# Patient Record
Sex: FEMALE | Race: WHITE
[De-identification: names, ages, dates, MRNs, and addresses within clinical notes are randomized per-mention and may not be internally consistent; named-entity substitution may affect disease eponyms.]

---

## 2018-06-03 ENCOUNTER — HOSPITAL ENCOUNTER (INPATIENT)
Dept: HOSPITAL 26 - MED | Age: 43
LOS: 5 days | Discharge: HOME | DRG: 263 | End: 2018-06-08
Attending: GENERAL PRACTICE | Admitting: GENERAL PRACTICE
Payer: MEDICAID

## 2018-06-03 VITALS — DIASTOLIC BLOOD PRESSURE: 47 MMHG | SYSTOLIC BLOOD PRESSURE: 143 MMHG

## 2018-06-03 VITALS — HEIGHT: 61 IN | WEIGHT: 180 LBS | BODY MASS INDEX: 33.99 KG/M2

## 2018-06-03 VITALS — SYSTOLIC BLOOD PRESSURE: 115 MMHG | DIASTOLIC BLOOD PRESSURE: 50 MMHG

## 2018-06-03 VITALS — DIASTOLIC BLOOD PRESSURE: 84 MMHG | SYSTOLIC BLOOD PRESSURE: 144 MMHG

## 2018-06-03 DIAGNOSIS — K59.03: ICD-10-CM

## 2018-06-03 DIAGNOSIS — E78.5: ICD-10-CM
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
## 2018-06-03 DIAGNOSIS — K80.12: Primary | ICD-10-CM

## 2018-06-03 DIAGNOSIS — R31.9: ICD-10-CM

## 2018-06-03 DIAGNOSIS — E87.6: ICD-10-CM

## 2018-06-03 DIAGNOSIS — T50.995A: ICD-10-CM

## 2018-06-03 LAB
ALBUMIN FLD-MCNC: 3.6 G/DL (ref 3.4–5)
AMYLASE SERPL-CCNC: 74 U/L (ref 25–115)
AMYLASE SERPL-CCNC: 82 U/L (ref 25–115)
ANION GAP SERPL CALCULATED.3IONS-SCNC: 13.3 MMOL/L (ref 8–16)
APPEARANCE UR: (no result)
AST SERPL-CCNC: 26 U/L (ref 15–37)
BARBITURATES UR QL SCN: (no result) NG/ML
BASOPHILS # BLD AUTO: 0 K/UL (ref 0–0.22)
BASOPHILS NFR BLD AUTO: 0.5 % (ref 0–2)
BENZODIAZ UR QL SCN: (no result) NG/ML
BILIRUB SERPL-MCNC: 0.3 MG/DL (ref 0–1)
BILIRUB UR QL STRIP: NEGATIVE
BUN SERPL-MCNC: 15 MG/DL (ref 7–18)
BZE UR QL SCN: (no result) NG/ML
CANNABINOIDS UR QL SCN: (no result) NG/ML
CHLORIDE SERPL-SCNC: 102 MMOL/L (ref 98–107)
CHOLEST/HDLC SERPL: 7.3 {RATIO} (ref 1–4.5)
CO2 SERPL-SCNC: 25.1 MMOL/L (ref 21–32)
COLOR UR: YELLOW
CREAT SERPL-MCNC: 0.9 MG/DL (ref 0.6–1.3)
EOSINOPHIL # BLD AUTO: 0.1 K/UL (ref 0–0.4)
EOSINOPHIL NFR BLD AUTO: 1 % (ref 0–4)
ERYTHROCYTE [DISTWIDTH] IN BLOOD BY AUTOMATED COUNT: 13.3 % (ref 11.6–13.7)
GFR SERPL CREATININE-BSD FRML MDRD: 89 ML/MIN (ref 90–?)
GLUCOSE SERPL-MCNC: 110 MG/DL (ref 74–106)
GLUCOSE UR STRIP-MCNC: NEGATIVE MG/DL
HCT VFR BLD AUTO: 43 % (ref 36–48)
HDLC SERPL-MCNC: 34 MG/DL (ref 40–60)
HGB BLD-MCNC: 14.3 G/DL (ref 12–16)
HGB UR QL STRIP: (no result)
LDLC SERPL CALC-MCNC: 189 MG/DL (ref 60–100)
LEUKOCYTE ESTERASE UR QL STRIP: NEGATIVE
LIPASE SERPL-CCNC: 126 U/L (ref 73–393)
LIPASE SERPL-CCNC: 177 U/L (ref 73–393)
LYMPHOCYTES # BLD AUTO: 2.5 K/UL (ref 2.5–16.5)
LYMPHOCYTES NFR BLD AUTO: 27.9 % (ref 20.5–51.1)
MAGNESIUM SERPL-MCNC: 2 MG/DL (ref 1.8–2.4)
MCH RBC QN AUTO: 29 PG (ref 27–31)
MCHC RBC AUTO-ENTMCNC: 33 G/DL (ref 33–37)
MCV RBC AUTO: 87.2 FL (ref 80–94)
MONOCYTES # BLD AUTO: 0.5 K/UL (ref 0.8–1)
MONOCYTES NFR BLD AUTO: 6.1 % (ref 1.7–9.3)
NEUTROPHILS # BLD AUTO: 5.7 K/UL (ref 1.8–7.7)
NEUTROPHILS NFR BLD AUTO: 64.5 % (ref 42.2–75.2)
NITRITE UR QL STRIP: NEGATIVE
OPIATES UR QL SCN: (no result) NG/ML
PCP UR QL SCN: (no result) NG/ML
PH UR STRIP: 5.5 [PH] (ref 5–9)
PHOSPHATE SERPL-MCNC: 2.8 MG/DL (ref 2.5–4.9)
PLATELET # BLD AUTO: 320 K/UL (ref 140–450)
POTASSIUM SERPL-SCNC: 3.4 MMOL/L (ref 3.5–5.1)
PROTHROMBIN TIME: 10.6 SECS (ref 10.8–13.4)
RBC # BLD AUTO: 4.92 MIL/UL (ref 4.2–5.4)
RBC #/AREA URNS HPF: (no result) /HPF (ref 0–5)
SODIUM SERPL-SCNC: 137 MMOL/L (ref 136–145)
T4 FREE SERPL-MCNC: 1.14 NG/DL (ref 0.76–1.46)
TRIGL SERPL-MCNC: 126 MG/DL (ref 30–150)
TSH SERPL DL<=0.05 MIU/L-ACNC: 2.43 UIU/ML (ref 0.34–3.74)
WBC # BLD AUTO: 8.9 K/UL (ref 4.8–10.8)
WBC,URINE: (no result) /HPF (ref 0–5)

## 2018-06-03 PROCEDURE — C9113 INJ PANTOPRAZOLE SODIUM, VIA: HCPCS

## 2018-06-03 PROCEDURE — A9503 TC99M MEDRONATE: HCPCS

## 2018-06-03 PROCEDURE — C1887 CATHETER, GUIDING: HCPCS

## 2018-06-03 PROCEDURE — A9510 TC99M DISOFENIN: HCPCS

## 2018-06-03 RX ADMIN — HYDROCODONE BITARTRATE AND ACETAMINOPHEN PRN TAB: 7.5; 325 TABLET ORAL at 22:18

## 2018-06-03 RX ADMIN — DOCUSATE SODIUM SCH MG: 100 CAPSULE, LIQUID FILLED ORAL at 20:48

## 2018-06-03 NOTE — NUR
PT BROUGHT IN FROM ER IN [facility], AMBULATES FROM HALLWAY TO 104B WITH STEADY GAIT, PT PLACED 
ON CARDIAC MONITOR, REPORT RECIVED FROM ER RN, PT AWAKE ALERT, RESP EVEN UNLABORED, SKIN 
WARM DRY COLOR WNL, PT DENIES PAIN OR DISCOMFORT AT THIS TIME, PT APPEARS COMFORTABLE, ABD 
SOFT, PT ORIENTED TO ROOM AND FLOOR, CALL SOARES WITHIN REACH SIDE RAILS UP, BED LOCKED IN LOW 
POSITION, WILL CONTINUE TO MONITOR AND REPORT TO ONCOMING SHIFT.

## 2018-06-03 NOTE — NUR
PT TRANSFERRED TO TELE ROOM 104B AT 1840 ON STABLE CONDITION SAFELY. VS WNL. 
REPORT GIVEN TO TELE NURSE FOR CONTINUITY OF CARE. HANDED ALL THE DOCUMENTS TO 
NURSE.

## 2018-06-03 NOTE — NUR
RECEIVED REPORT AT PT BEDSIDE FROM DAY SHIFT RN, FOR CONTINUITY OF CARE. PATIENT IS A/OX4 ON 
ROOM AIR, [place] SPEAKING ONLY. ABLE TO MAKE NEEDS KNOWN, ABLE TO FOLLOW COMMANDS.  PT SKIN 
IS INTACT. PT IS NPO. PATIENT HAS 20G IV TO RIGHT AC, ASYMPTOMATIC, INTACT, PATENT. 
RESPIRATIONS EVEN AND UNLABORED. UPDATED BOARD. VITAL SIGNS WITHIN NORMAL LIMITS. PT STABLE, 
NO SIGNS OF DISTRESS NOTED AT THIS TIME. BED IN LOWEST POSITION, BED ALARM ON. CALL LIGHT 
WITHIN REACH, WILL CONTINUE TO MONITOR.

## 2018-06-03 NOTE — NUR
PT APPEARS TO BE RESTING COMFORTABLY IN BED AT THIS TIME. NO ACUTE DISTRESS 
NOTED. NO CHANGE IN LOC. DENIES PAIN.  AT BEDSIDE.

## 2018-06-03 NOTE — NUR
PATIENT PRESENTS TO ED WITH THE CHIEF C/O ABDOMINAL PAIN . PT STATES PAIN 
STARTED SINCE YESTERDAY BECAME WORSE . PT HAS N/V. DENIES DIARRHEA. SKIN IS 
PINK/WARM/DRY; AAOX4 WITH EVEN AND STEADY GAIT. HR EVEN AND REGULAR; PT DENIES 
ANY FEVER, CP, SOB, OR COUGH AT THIS TIME. PATIENT STATES ABD PAIN OF 10/10 AT 
THIS TIME. ADMINISTERED MEDICATION AS ORDERED. VSS WNL. PATIENT POSITIONED FOR 
COMFORT; HOB ELEVATED; BEDRAILS UP X2; BED DOWN. ER MD MADE AWARE OF PT STATUS.

## 2018-06-03 NOTE — NUR
ADMINISTERED PAIN MEDICATION, PT TOLERATED WELL. PT STABLE, NO SIGNS OF DISTRESS NOTED AT 
THIS TIME. BED IN LOWEST POSITION, BED ALARM ON. CALL LIGHT WITHIN REACH, WILL CONTINUE TO 
MONITOR.

## 2018-06-03 NOTE — NUR
ADMINISTERED SCHEDULED MEDICATION, PT TOLERATED WELL. NO PROBLEMS SWALLOWING NOTED. NO SIGNS 
OF DISTRESS NOTED AT THIS TIME. BED IN LOWEST POSITION, BED ALARM ON. CALL LIGHT WITHIN 
REACH, WILL CONTINUE TO MONITOR.

## 2018-06-03 NOTE — NUR
SPOKE TO RADIOLOGY ABOUT HIDA SCAN BECAUSE PT IS IN PAIN AND WANTS PAIN MEDICATION. THEY 
WILL NOT BE ABLE TO DO HIDA SCAN TONIGHT SO IT'S OK TO GIVE NARCOTICS FOR NOW. WILL BE 
UPDATED IN MORNING OF TIME THEY WILL DO HIDA SCAN.

## 2018-06-04 VITALS — SYSTOLIC BLOOD PRESSURE: 113 MMHG | DIASTOLIC BLOOD PRESSURE: 52 MMHG

## 2018-06-04 VITALS — DIASTOLIC BLOOD PRESSURE: 63 MMHG | SYSTOLIC BLOOD PRESSURE: 114 MMHG

## 2018-06-04 VITALS — DIASTOLIC BLOOD PRESSURE: 79 MMHG | SYSTOLIC BLOOD PRESSURE: 138 MMHG

## 2018-06-04 VITALS — SYSTOLIC BLOOD PRESSURE: 126 MMHG | DIASTOLIC BLOOD PRESSURE: 68 MMHG

## 2018-06-04 VITALS — DIASTOLIC BLOOD PRESSURE: 56 MMHG | SYSTOLIC BLOOD PRESSURE: 107 MMHG

## 2018-06-04 VITALS — DIASTOLIC BLOOD PRESSURE: 67 MMHG | SYSTOLIC BLOOD PRESSURE: 113 MMHG

## 2018-06-04 LAB
ANION GAP SERPL CALCULATED.3IONS-SCNC: 18.2 MMOL/L (ref 8–16)
BASOPHILS # BLD AUTO: 0 K/UL (ref 0–0.22)
BASOPHILS NFR BLD AUTO: 0.4 % (ref 0–2)
BUN SERPL-MCNC: 11 MG/DL (ref 7–18)
CHLORIDE SERPL-SCNC: 102 MMOL/L (ref 98–107)
CO2 SERPL-SCNC: 25.7 MMOL/L (ref 21–32)
CREAT SERPL-MCNC: 0.7 MG/DL (ref 0.6–1.3)
EOSINOPHIL # BLD AUTO: 0.1 K/UL (ref 0–0.4)
EOSINOPHIL NFR BLD AUTO: 0.8 % (ref 0–4)
ERYTHROCYTE [DISTWIDTH] IN BLOOD BY AUTOMATED COUNT: 13.3 % (ref 11.6–13.7)
GFR SERPL CREATININE-BSD FRML MDRD: 119 ML/MIN (ref 90–?)
GLUCOSE SERPL-MCNC: 94 MG/DL (ref 74–106)
HCT VFR BLD AUTO: 39.2 % (ref 36–48)
HGB BLD-MCNC: 12.9 G/DL (ref 12–16)
LYMPHOCYTES # BLD AUTO: 1.8 K/UL (ref 2.5–16.5)
LYMPHOCYTES NFR BLD AUTO: 22.6 % (ref 20.5–51.1)
MAGNESIUM SERPL-MCNC: 2.2 MG/DL (ref 1.8–2.4)
MCH RBC QN AUTO: 29 PG (ref 27–31)
MCHC RBC AUTO-ENTMCNC: 33 G/DL (ref 33–37)
MCV RBC AUTO: 88 FL (ref 80–94)
MONOCYTES # BLD AUTO: 0.4 K/UL (ref 0.8–1)
MONOCYTES NFR BLD AUTO: 5.6 % (ref 1.7–9.3)
NEUTROPHILS # BLD AUTO: 5.6 K/UL (ref 1.8–7.7)
NEUTROPHILS NFR BLD AUTO: 70.6 % (ref 42.2–75.2)
PHOSPHATE SERPL-MCNC: 2.8 MG/DL (ref 2.5–4.9)
PLATELET # BLD AUTO: 294 K/UL (ref 140–450)
POTASSIUM SERPL-SCNC: 3.9 MMOL/L (ref 3.5–5.1)
RBC # BLD AUTO: 4.46 MIL/UL (ref 4.2–5.4)
SODIUM SERPL-SCNC: 142 MMOL/L (ref 136–145)
WBC # BLD AUTO: 7.9 K/UL (ref 4.8–10.8)

## 2018-06-04 RX ADMIN — DOCUSATE SODIUM SCH MG: 100 CAPSULE, LIQUID FILLED ORAL at 21:01

## 2018-06-04 RX ADMIN — PANTOPRAZOLE SODIUM SCH MG: 40 INJECTION, POWDER, FOR SOLUTION INTRAVENOUS at 10:53

## 2018-06-04 RX ADMIN — DEXTROSE AND SODIUM CHLORIDE SCH MLS/HR: 5; .9 INJECTION, SOLUTION INTRAVENOUS at 18:22

## 2018-06-04 RX ADMIN — DEXTROSE AND SODIUM CHLORIDE SCH MLS/HR: 5; .9 INJECTION, SOLUTION INTRAVENOUS at 10:30

## 2018-06-04 RX ADMIN — DOCUSATE SODIUM SCH MG: 100 CAPSULE, LIQUID FILLED ORAL at 10:53

## 2018-06-04 NOTE — NUR
RECEIVED PT FROM NIGHT SHIFT NURSE, AWAKE WITH  ON THE BEDSIDE, WITH AN IV LINE AT RT 
AC G.20, NS RUNNING AT 50ML/HR. PLAN OF CARE DISCUSSED. PT IS ALERT, ORIENTEDX4, NO PAIN 
THIS TIME.CALL LIGHT WITHIN REACH AND SIDE RAILS ARE UP. WILL CONTINUE TO MONITOR.

## 2018-06-04 NOTE — NUR
PT WENT FOR A HIDRA SCAN AND CT OF THE CHEST WITH IV CONTRAST. NO SIGN OF DISTRESS NOTED. PT 
IS STABLE.

## 2018-06-04 NOTE — NUR
PT DENIES PAIN. VITAL SIGNS WITHIN NORMAL LIMITS. PT STABLE, NO SIGNS OF DISTRESS NOTED AT 
THIS TIME. BED IN LOWEST POSITION, BED ALARM ON. CALL LIGHT WITHIN REACH, WILL CONTINUE TO 
MONITOR.

## 2018-06-04 NOTE — NUR
REPORT RECEIVED FROM AM NURSE. PT IN STABLE CONDITION. INTRODUCED MYSELF TO PT. AAOX4. EYES 
PERRL 3MM. LUNGS CLEAR BILATERALLY. HEART SOUNDS S1 S2 NORMAL. BOWEL SOUNDS ACTIVE X4 
QUADRANTS. CAP REFILL < 3S. SKIN INTACT, DRY, AND WARM. IV LEAKING. WILL PLACE NEW IV. VS 
STABLE. PT NOT IN ACUTE DISTRESS. BED LOCKED IN LOW POSITION. CALL BELL WITHIN REACH. WILL 
CONTINUE TO MONITOR.

## 2018-06-04 NOTE — NUR
ASSISTED PT TO THE BATHROOM AND BACK TO BED, IV WAS CHECKED. NO SIGN OF DISTRESS NOTED. CALL 
LIGHT WITHIN REACH AND WILL CONTINUE TO MONITOR.

## 2018-06-04 NOTE — NUR
CALLED DR LAWSON WITH RESULTS OF NM HIDA SCAN. RESULTS ARE "THE COMMON BILE DUCT IS 
VISUALIZED. THERE IS NO EVIDENCE OF OBSTRUCTION OF THE COMMON DUCT. NORMAL SMALL BOWEL 
ACTIVITY IS SEEN." "IMPRESSION: NONVISUALIZATION OF THE GALLBLADDER UP TO 60 MINUTES POST 
INJECTION. HOWEVER, THERE IS FAINT RADIONUCLIDE ACTIVITY IN THE ANTICIPATED LOCATION OF THE 
GALLBLADDER  MINUTES POST INJECTION SUGGESTING CHRONIC CHOLECYSTITIS." DR LAWSON 
STATES THAT PATIENT DOES NOT NEED TO BE NPO. THE PT IS TO BE DC AND MAKE AN APPOINTMENT TO 
BE SEEN BY DR LAWSON AT A LATER DATE.

## 2018-06-04 NOTE — NUR
PT CAME BACK TO THE ROOM FROM THE RADIOLOGY FROM THE CT AND HIDRA SCAN PRECEDURES. NO SIGN 
OF DISTRESS NOTED. WILL MONITOR.

## 2018-06-04 NOTE — NUR
PT IS AWAKE AND NS WAS DISCONTINUED. PT WAS STARTED ON IV FLUID OF DEXTROSE 5%- NACL 0.9% 
RUNNING AT 90ML/HR. MEDICATIONS GIVEN AND PT TOLERATED IT. NMO SIGN OF DISTRESS NOTED. WILL 
CONTINUE TO MONITOR.

## 2018-06-04 NOTE — NUR
PT IS AWAKE AND VITAL SIGNS TAKEN AND IS STABLE. NO SIGN OF DISTRESS NOTED. CALL LIGHT 
WITHIN REACH AND WILL CONTINUE TO MONITOR.

## 2018-06-04 NOTE — NUR
VITAL SIGNS WITHIN NORMAL LIMITS. PT STABLE, NO SIGNS OF DISTRESS NOTED AT THIS TIME. BED IN 
LOWEST POSITION, BED ALARM ON. CALL LIGHT WITHIN REACH, WILL CONTINUE TO MONITOR.

## 2018-06-04 NOTE — NUR
PATIENT HAS BEEN SCREENED AND CATEGORIZED AS MODERATE NUTRITION RISK. PATIENT WILL BE SEEN 
WITHIN 3-5 DAYS OF ADMISSION.



6/6/18 6/8/18



KIN NEAL RD

## 2018-06-05 VITALS — SYSTOLIC BLOOD PRESSURE: 121 MMHG | DIASTOLIC BLOOD PRESSURE: 68 MMHG

## 2018-06-05 VITALS — DIASTOLIC BLOOD PRESSURE: 60 MMHG | SYSTOLIC BLOOD PRESSURE: 120 MMHG

## 2018-06-05 VITALS — DIASTOLIC BLOOD PRESSURE: 55 MMHG | SYSTOLIC BLOOD PRESSURE: 119 MMHG

## 2018-06-05 VITALS — SYSTOLIC BLOOD PRESSURE: 124 MMHG | DIASTOLIC BLOOD PRESSURE: 63 MMHG

## 2018-06-05 VITALS — DIASTOLIC BLOOD PRESSURE: 70 MMHG | SYSTOLIC BLOOD PRESSURE: 132 MMHG

## 2018-06-05 VITALS — DIASTOLIC BLOOD PRESSURE: 56 MMHG | SYSTOLIC BLOOD PRESSURE: 126 MMHG

## 2018-06-05 LAB
ANION GAP SERPL CALCULATED.3IONS-SCNC: 9.5 MMOL/L (ref 8–16)
BASOPHILS # BLD AUTO: 0 K/UL (ref 0–0.22)
BASOPHILS NFR BLD AUTO: 0.4 % (ref 0–2)
BUN SERPL-MCNC: 9 MG/DL (ref 7–18)
CHLORIDE SERPL-SCNC: 104 MMOL/L (ref 98–107)
CO2 SERPL-SCNC: 27.3 MMOL/L (ref 21–32)
CREAT SERPL-MCNC: 0.8 MG/DL (ref 0.6–1.3)
EOSINOPHIL # BLD AUTO: 0.1 K/UL (ref 0–0.4)
EOSINOPHIL NFR BLD AUTO: 1.1 % (ref 0–4)
ERYTHROCYTE [DISTWIDTH] IN BLOOD BY AUTOMATED COUNT: 13.2 % (ref 11.6–13.7)
GFR SERPL CREATININE-BSD FRML MDRD: 102 ML/MIN (ref 90–?)
GLUCOSE SERPL-MCNC: 98 MG/DL (ref 74–106)
HCT VFR BLD AUTO: 41.4 % (ref 36–48)
HGB BLD-MCNC: 13.7 G/DL (ref 12–16)
LYMPHOCYTES # BLD AUTO: 1.6 K/UL (ref 2.5–16.5)
LYMPHOCYTES NFR BLD AUTO: 22.3 % (ref 20.5–51.1)
MAGNESIUM SERPL-MCNC: 2.4 MG/DL (ref 1.8–2.4)
MCH RBC QN AUTO: 29 PG (ref 27–31)
MCHC RBC AUTO-ENTMCNC: 33 G/DL (ref 33–37)
MCV RBC AUTO: 88 FL (ref 80–94)
MONOCYTES # BLD AUTO: 0.4 K/UL (ref 0.8–1)
MONOCYTES NFR BLD AUTO: 5.8 % (ref 1.7–9.3)
NEUTROPHILS # BLD AUTO: 5 K/UL (ref 1.8–7.7)
NEUTROPHILS NFR BLD AUTO: 70.4 % (ref 42.2–75.2)
PHOSPHATE SERPL-MCNC: 2.6 MG/DL (ref 2.5–4.9)
PLATELET # BLD AUTO: 302 K/UL (ref 140–450)
POTASSIUM SERPL-SCNC: 3.8 MMOL/L (ref 3.5–5.1)
RBC # BLD AUTO: 4.7 MIL/UL (ref 4.2–5.4)
SODIUM SERPL-SCNC: 137 MMOL/L (ref 136–145)
WBC # BLD AUTO: 7.1 K/UL (ref 4.8–10.8)

## 2018-06-05 RX ADMIN — DEXTROSE AND SODIUM CHLORIDE SCH MLS/HR: 5; .9 INJECTION, SOLUTION INTRAVENOUS at 05:29

## 2018-06-05 RX ADMIN — PANTOPRAZOLE SODIUM SCH MG: 40 INJECTION, POWDER, FOR SOLUTION INTRAVENOUS at 10:15

## 2018-06-05 RX ADMIN — ATORVASTATIN CALCIUM SCH MG: 20 TABLET, FILM COATED ORAL at 10:15

## 2018-06-05 RX ADMIN — HYDROCODONE BITARTRATE AND ACETAMINOPHEN PRN TAB: 7.5; 325 TABLET ORAL at 10:15

## 2018-06-05 RX ADMIN — DOCUSATE SODIUM SCH MG: 100 CAPSULE, LIQUID FILLED ORAL at 20:49

## 2018-06-05 RX ADMIN — DOCUSATE SODIUM SCH MG: 100 CAPSULE, LIQUID FILLED ORAL at 10:15

## 2018-06-05 RX ADMIN — DEXTROSE AND SODIUM CHLORIDE SCH MLS/HR: 5; .9 INJECTION, SOLUTION INTRAVENOUS at 20:48

## 2018-06-05 NOTE — NUR
PT AWAKE. NO  C/O ANY DISCOMFORT NOR PAIN NOTED.  INSTRUCTED AGAIN ABOUT NPO STATUS AFTER 
MN. VERBALIZED UNDERSTANDING . WILL CONTINUE TO  MONITOR.

## 2018-06-05 NOTE — NUR
ENDORSED PT TO NIGHT SHIFT NURSE, KEVIN FOR CONTINUITY OF CARE, PT IS AWAKE AND SEATED ON THE 
BED WITH FAMILY ON BEDSIDE AND IS STABLE AT THIS TIME.

## 2018-06-05 NOTE — NUR
RECEIVED PT IN STABLE CONDITION FROM AM NURSE. PT ON MED SURG UNIT. AWAKE, ALERT AND 
ORIENTED X4, Greenlandic SPEAKING. WITH FAMILY AT BEDSIDE WHO SPEAK ENGLISH. PT AWARE OF THE 
SURGERY TO BE DONE TOMORROW.  INSTRUCTED PT ABOUT NPO STATUS AFTER MN. VERBALIZED 
UNDERSTANDING. BED ON LOWEST POSITION. CALL LIGHT PLACED WITHIN EASY REACH. WILL CONTINUE TO 
MONITOR.

## 2018-06-05 NOTE — NUR
PT IS AWAKE AND LYING ON THE BED, CARE PLAN DISCUSSED AND PT VERBALIZED UNDERSTANDING. VITAL 
SIGNS TAKEN AND IS STABLE. WILL CONTINUE TO MONITOR.

## 2018-06-05 NOTE — NUR
ACKNOWLEDGED AN ORDER FOR THE PT DORINA OBTAIN CONSENT FOR A LAPAROSCOPIC CHOLECYSTECTOMY DORINA 
WITH DR. YU POTTS. WILL FACILITATE CONSENT SIGNING.

## 2018-06-05 NOTE — NUR
RECEIVED REPORT FROM NIGHT SHIFT NURSE, GLENN, PT IS ASLEEP LYING ON THE BED WITH AN IV LINE 
ON RT AC G. 20, NOT HOOKED UP WITH THE IVF OF DEXTROSE5%-NACL 0.9%. SIDE RAILS ARE UP AND 
CALL LIGHT WITHIN REACH, RESPIRATION EVEN AND NO SIGN OF DISTRESS NOTED. WILL CONTINUE TO 
MONITOR.

## 2018-06-05 NOTE — NUR
PT IS AWAKE WITH  ON THE BEDSIDE, VITAL SIGNS TAKEN AND IS STABLE. NO SIGN OFF 
DISTRESS NOTED ON THE PT. WILL MONITOR.

## 2018-06-06 VITALS — SYSTOLIC BLOOD PRESSURE: 118 MMHG | DIASTOLIC BLOOD PRESSURE: 59 MMHG

## 2018-06-06 VITALS — DIASTOLIC BLOOD PRESSURE: 55 MMHG | SYSTOLIC BLOOD PRESSURE: 122 MMHG

## 2018-06-06 VITALS — SYSTOLIC BLOOD PRESSURE: 119 MMHG | DIASTOLIC BLOOD PRESSURE: 62 MMHG

## 2018-06-06 VITALS — DIASTOLIC BLOOD PRESSURE: 69 MMHG | SYSTOLIC BLOOD PRESSURE: 126 MMHG

## 2018-06-06 VITALS — SYSTOLIC BLOOD PRESSURE: 115 MMHG | DIASTOLIC BLOOD PRESSURE: 55 MMHG

## 2018-06-06 VITALS — SYSTOLIC BLOOD PRESSURE: 114 MMHG | DIASTOLIC BLOOD PRESSURE: 57 MMHG

## 2018-06-06 VITALS — DIASTOLIC BLOOD PRESSURE: 60 MMHG | SYSTOLIC BLOOD PRESSURE: 124 MMHG

## 2018-06-06 VITALS — SYSTOLIC BLOOD PRESSURE: 122 MMHG | DIASTOLIC BLOOD PRESSURE: 58 MMHG

## 2018-06-06 LAB
ANION GAP SERPL CALCULATED.3IONS-SCNC: 13.4 MMOL/L (ref 8–16)
BASOPHILS # BLD AUTO: 0 K/UL (ref 0–0.22)
BASOPHILS NFR BLD AUTO: 0.7 % (ref 0–2)
BUN SERPL-MCNC: 9 MG/DL (ref 7–18)
CHLORIDE SERPL-SCNC: 103 MMOL/L (ref 98–107)
CO2 SERPL-SCNC: 24.7 MMOL/L (ref 21–32)
CREAT SERPL-MCNC: 0.7 MG/DL (ref 0.6–1.3)
EOSINOPHIL # BLD AUTO: 0.1 K/UL (ref 0–0.4)
EOSINOPHIL NFR BLD AUTO: 1.4 % (ref 0–4)
ERYTHROCYTE [DISTWIDTH] IN BLOOD BY AUTOMATED COUNT: 13 % (ref 11.6–13.7)
GFR SERPL CREATININE-BSD FRML MDRD: 119 ML/MIN (ref 90–?)
GLUCOSE SERPL-MCNC: 119 MG/DL (ref 74–106)
HCT VFR BLD AUTO: 40.8 % (ref 36–48)
HGB BLD-MCNC: 13.7 G/DL (ref 12–16)
LYMPHOCYTES # BLD AUTO: 1.8 K/UL (ref 2.5–16.5)
LYMPHOCYTES NFR BLD AUTO: 27.4 % (ref 20.5–51.1)
MAGNESIUM SERPL-MCNC: 2.1 MG/DL (ref 1.8–2.4)
MCH RBC QN AUTO: 29 PG (ref 27–31)
MCHC RBC AUTO-ENTMCNC: 34 G/DL (ref 33–37)
MCV RBC AUTO: 87.1 FL (ref 80–94)
MONOCYTES # BLD AUTO: 0.4 K/UL (ref 0.8–1)
MONOCYTES NFR BLD AUTO: 6.6 % (ref 1.7–9.3)
NEUTROPHILS # BLD AUTO: 4.2 K/UL (ref 1.8–7.7)
NEUTROPHILS NFR BLD AUTO: 63.9 % (ref 42.2–75.2)
PHOSPHATE SERPL-MCNC: 2.7 MG/DL (ref 2.5–4.9)
PLATELET # BLD AUTO: 299 K/UL (ref 140–450)
POTASSIUM SERPL-SCNC: 3.1 MMOL/L (ref 3.5–5.1)
RBC # BLD AUTO: 4.69 MIL/UL (ref 4.2–5.4)
SODIUM SERPL-SCNC: 138 MMOL/L (ref 136–145)
WBC # BLD AUTO: 6.5 K/UL (ref 4.8–10.8)

## 2018-06-06 PROCEDURE — 0FT44ZZ RESECTION OF GALLBLADDER, PERCUTANEOUS ENDOSCOPIC APPROACH: ICD-10-PCS | Performed by: SURGERY

## 2018-06-06 RX ADMIN — DEXTROSE AND SODIUM CHLORIDE SCH MLS/HR: 5; .9 INJECTION, SOLUTION INTRAVENOUS at 21:37

## 2018-06-06 RX ADMIN — ATORVASTATIN CALCIUM SCH MG: 20 TABLET, FILM COATED ORAL at 09:00

## 2018-06-06 RX ADMIN — PANTOPRAZOLE SODIUM SCH MG: 40 INJECTION, POWDER, FOR SOLUTION INTRAVENOUS at 10:31

## 2018-06-06 RX ADMIN — DEXTROSE AND SODIUM CHLORIDE SCH MLS/HR: 5; .9 INJECTION, SOLUTION INTRAVENOUS at 03:29

## 2018-06-06 RX ADMIN — HYDROCODONE BITARTRATE AND ACETAMINOPHEN PRN TAB: 5; 325 TABLET ORAL at 19:37

## 2018-06-06 RX ADMIN — DOCUSATE SODIUM SCH MG: 100 CAPSULE, LIQUID FILLED ORAL at 09:00

## 2018-06-06 RX ADMIN — DOCUSATE SODIUM SCH MG: 100 CAPSULE, LIQUID FILLED ORAL at 21:33

## 2018-06-06 NOTE — NUR
PATIENT BROUGHT BACK TO ROOM. HAS NO SIGNS AND SYMPTOMS OF ACUTE DISTRESS NOTED AT THIS 
TIME. DENIES PAIN. HAS FOUR INCISION SITES WITH BANDAIDS. CLEAN AND DRY. WILL CONTINUE TO 
MONITOR.

## 2018-06-06 NOTE — NUR
RECEIVED REPORT FROM NIGHT SHIFT RN. PATIENT IS AAOX4, NO SIGNS AND SYMPTOMS OF ACUTE 
DISTRESS NOTED AT THIS TIME. HAS IV TO THE RIGHT AC 20G, INFUSING D5NS AT 90 ML/HR. SITE IS 
CLEAN, DRY, PATENT AND INTACT. DISCUSSED PLAN OF CARE WITH PATIENT AND SHE VERBALIZED 
UNDERSTANDING. BED IN LOWEST POSITION, SIDE RAILS UP X3, CALL LIGHT WITHIN REACH, WILL 
CONTINUE TO MONITOR.

## 2018-06-06 NOTE — NUR
RESTING, HAS NO SIGNS AND SYMPTOMS OF ACUTE DISTRESS NOTED AT THIS TIME. DENIES ANY PAIN AT 
THIS TIME.

## 2018-06-06 NOTE — NUR
SPOKE WITH DR DUONG IN REGARDS TO HIS ORDER FOR THE BONE SCAN. NUCLEAR MED IS HERE TO 
INJECT DYE BUT PATIENT WILL BE GOING TO SURGERY AROUND 1040 THIS MORNING. INFORMED HIM OF 
THIS AND ASKED IF IT WAS OKAY TO RESCHEDULE THE SCAN.  STATED IT WAS OKAY.

## 2018-06-06 NOTE — NUR
RECEIVE DPT IN STABLE CONDITION FROM AM NURSE. AWAKE,ALERT AND ORIENTED X4. Upper sorbian 
SPEAKING. S/P LAP MARY WITH X4 INCISIONS WITH SMALL BANDAIDS ON ABDOMEN. NO BLEEDING NOR 
REDNESS NOTED. SHE SAID SHE VOIDED ONCE AFTER  SURGERY . ENCOURAGED TO CALL FOR ASSIST IF 
NEED TO GO BATHROOM. IVF INFUSING WELL ON THE RT AC#20. BED ON LOW POSITION. CALL LIGHT 
PLACED WITHIN REACH. WILL CONTINUE TO MONITOR.

## 2018-06-07 VITALS — SYSTOLIC BLOOD PRESSURE: 121 MMHG | DIASTOLIC BLOOD PRESSURE: 60 MMHG

## 2018-06-07 VITALS — DIASTOLIC BLOOD PRESSURE: 56 MMHG | SYSTOLIC BLOOD PRESSURE: 115 MMHG

## 2018-06-07 VITALS — SYSTOLIC BLOOD PRESSURE: 133 MMHG | DIASTOLIC BLOOD PRESSURE: 61 MMHG

## 2018-06-07 LAB
ANION GAP SERPL CALCULATED.3IONS-SCNC: 12.3 MMOL/L (ref 8–16)
BASOPHILS # BLD AUTO: 0 K/UL (ref 0–0.22)
BASOPHILS NFR BLD AUTO: 0.5 % (ref 0–2)
BUN SERPL-MCNC: 8 MG/DL (ref 7–18)
CHLORIDE SERPL-SCNC: 106 MMOL/L (ref 98–107)
CO2 SERPL-SCNC: 25.5 MMOL/L (ref 21–32)
CREAT SERPL-MCNC: 0.8 MG/DL (ref 0.6–1.3)
EOSINOPHIL # BLD AUTO: 0.1 K/UL (ref 0–0.4)
EOSINOPHIL NFR BLD AUTO: 0.7 % (ref 0–4)
ERYTHROCYTE [DISTWIDTH] IN BLOOD BY AUTOMATED COUNT: 13.1 % (ref 11.6–13.7)
GFR SERPL CREATININE-BSD FRML MDRD: 102 ML/MIN (ref 90–?)
GLUCOSE SERPL-MCNC: 112 MG/DL (ref 74–106)
HCT VFR BLD AUTO: 38.1 % (ref 36–48)
HGB BLD-MCNC: 12.6 G/DL (ref 12–16)
LYMPHOCYTES # BLD AUTO: 2.1 K/UL (ref 2.5–16.5)
LYMPHOCYTES NFR BLD AUTO: 21.8 % (ref 20.5–51.1)
MAGNESIUM SERPL-MCNC: 1.9 MG/DL (ref 1.8–2.4)
MCH RBC QN AUTO: 29 PG (ref 27–31)
MCHC RBC AUTO-ENTMCNC: 33 G/DL (ref 33–37)
MCV RBC AUTO: 88.1 FL (ref 80–94)
MONOCYTES # BLD AUTO: 0.7 K/UL (ref 0.8–1)
MONOCYTES NFR BLD AUTO: 7.1 % (ref 1.7–9.3)
NEUTROPHILS # BLD AUTO: 6.7 K/UL (ref 1.8–7.7)
NEUTROPHILS NFR BLD AUTO: 69.9 % (ref 42.2–75.2)
PHOSPHATE SERPL-MCNC: 2.2 MG/DL (ref 2.5–4.9)
PLATELET # BLD AUTO: 283 K/UL (ref 140–450)
POTASSIUM SERPL-SCNC: 3.8 MMOL/L (ref 3.5–5.1)
RBC # BLD AUTO: 4.32 MIL/UL (ref 4.2–5.4)
SODIUM SERPL-SCNC: 140 MMOL/L (ref 136–145)
WBC # BLD AUTO: 9.7 K/UL (ref 4.8–10.8)

## 2018-06-07 RX ADMIN — PANTOPRAZOLE SODIUM SCH MG: 40 INJECTION, POWDER, FOR SOLUTION INTRAVENOUS at 10:19

## 2018-06-07 RX ADMIN — DEXTROSE AND SODIUM CHLORIDE SCH MLS/HR: 5; .9 INJECTION, SOLUTION INTRAVENOUS at 13:22

## 2018-06-07 RX ADMIN — DOCUSATE SODIUM SCH MG: 100 CAPSULE, LIQUID FILLED ORAL at 20:33

## 2018-06-07 RX ADMIN — HYDROCODONE BITARTRATE AND ACETAMINOPHEN PRN TAB: 5; 325 TABLET ORAL at 00:50

## 2018-06-07 RX ADMIN — DEXTROSE AND SODIUM CHLORIDE SCH MLS/HR: 5; .9 INJECTION, SOLUTION INTRAVENOUS at 19:50

## 2018-06-07 RX ADMIN — DOCUSATE SODIUM SCH MG: 100 CAPSULE, LIQUID FILLED ORAL at 10:20

## 2018-06-07 RX ADMIN — ATORVASTATIN CALCIUM SCH MG: 20 TABLET, FILM COATED ORAL at 10:20

## 2018-06-07 RX ADMIN — HYDROCODONE BITARTRATE AND ACETAMINOPHEN PRN TAB: 5; 325 TABLET ORAL at 10:20

## 2018-06-07 RX ADMIN — HYDROCODONE BITARTRATE AND ACETAMINOPHEN PRN TAB: 5; 325 TABLET ORAL at 17:42

## 2018-06-07 NOTE — NUR
ADMINISTERED SCHEDULED MEDICATIONS, PT TOLERATED WELL. PT STABLE, NO SIGNS OF DISTRESS NOTED 
AT THIS TIME. BED IN LOWEST POSITION, BED ALARM ON. CALL LIGHT WITHIN REACH, WILL CONTINUE 
TO MONITOR.

## 2018-06-07 NOTE — NUR
MADE ROUNDS. ASKED PT IF NEED PAIN MED . SHE SAID NO. INSTRUCTED PT TO AMBULATE TODAY. 
VERBALIZED UNDERSTANDING.

## 2018-06-07 NOTE — NUR
RECEIVED REPORT AT PT BEDSIDE FROM DAY SHIFT RN, FOR CONTINUITY OF CARE. PATIENT IS A/OX4 ON 
ROOM AIR, Georgian SPEAKING ONLY. ABLE TO MAKE NEEDS KNOWN, ABLE TO FOLLOW COMMANDS.  PT IS 
S/P MIRTHA HERNANDEZ AND HAS 4 ABDOMINAL INCISIONS. PATIENT HAS 22G IV TO RIGHT WRIST. RESPIRATIONS 
EVEN AND UNLABORED. UPDATED BOARD. VITAL SIGNS WITHIN NORMAL LIMITS. PT STABLE, NO SIGNS OF 
DISTRESS NOTED AT THIS TIME. BED IN LOWEST POSITION, BED ALARM ON. CALL LIGHT WITHIN REACH, 
WILL CONTINUE TO MONITOR.

## 2018-06-07 NOTE — NUR
ADMINISTERED MORNING MEDS TO PT. PT TOLERATED WELL. FAMILY IS AT BEDSIDE. WILL CONTINUE TO 
MONITOR.

## 2018-06-07 NOTE — NUR
RECEIVED REPORT AT PT BEDSIDE FROM DAY SHIFT RN, FOR CONTINUITY OF CARE. PATIENT IS A/OX4 ON 
ROOM AIR, Romanian SPEAKING ONLY. ABLE TO MAKE NEEDS KNOWN, ABLE TO FOLLOW COMMANDS.  PT SKIN 
IS INTACT. PT IS NPO. PATIENT HAS 22G IV TO RIGHT WRIST. RESPIRATIONS EVEN AND UNLABORED. 
UPDATED BOARD. VITAL SIGNS WITHIN NORMAL LIMITS. PT STABLE, NO SIGNS OF DISTRESS NOTED AT 
THIS TIME. BED IN LOWEST POSITION, BED ALARM ON. CALL LIGHT WITHIN REACH, WILL CONTINUE TO 
MONITOR. 

-------------------------------------------------------------------------------

Addendum: 06/08/18 at 0500 by Danielle Villanueva RN

-------------------------------------------------------------------------------

DISREGARD.

## 2018-06-07 NOTE — NUR
PT IS UP AND AMBULATING THE HALLWAY WITH FAMILY MEMBER. NO SIGNS OF DISTRESS. WILL CONTINUE 
TO MONITOR.

## 2018-06-07 NOTE — NUR
RECEIVED PT FROM NIGHT SHIFT NURSE AT BEDSIDE. PT HAS R HAND IV 22G. SITE IS CLEAN, DRY AND 
PATENT. PT IS A/O X4. BED IS IN LOWEST POSITION WITH SIDE RAILS UP X2. CALL LIGHT IS WITHIN 
REACH. NO SIGNS OF DISTRESS. WILL CONTINUE TO MONITOR PT.

## 2018-06-08 VITALS — SYSTOLIC BLOOD PRESSURE: 121 MMHG | DIASTOLIC BLOOD PRESSURE: 78 MMHG

## 2018-06-08 VITALS — SYSTOLIC BLOOD PRESSURE: 120 MMHG | DIASTOLIC BLOOD PRESSURE: 63 MMHG

## 2018-06-08 LAB
ANION GAP SERPL CALCULATED.3IONS-SCNC: 11.6 MMOL/L (ref 8–16)
BASOPHILS # BLD AUTO: 0 K/UL (ref 0–0.22)
BASOPHILS NFR BLD AUTO: 0.5 % (ref 0–2)
BUN SERPL-MCNC: 7 MG/DL (ref 7–18)
CHLORIDE SERPL-SCNC: 105 MMOL/L (ref 98–107)
CO2 SERPL-SCNC: 27.3 MMOL/L (ref 21–32)
CREAT SERPL-MCNC: 0.8 MG/DL (ref 0.6–1.3)
EOSINOPHIL # BLD AUTO: 0.1 K/UL (ref 0–0.4)
EOSINOPHIL NFR BLD AUTO: 0.7 % (ref 0–4)
ERYTHROCYTE [DISTWIDTH] IN BLOOD BY AUTOMATED COUNT: 13.2 % (ref 11.6–13.7)
GFR SERPL CREATININE-BSD FRML MDRD: 102 ML/MIN (ref 90–?)
GLUCOSE SERPL-MCNC: 124 MG/DL (ref 74–106)
HCT VFR BLD AUTO: 39.1 % (ref 36–48)
HGB BLD-MCNC: 13 G/DL (ref 12–16)
LYMPHOCYTES # BLD AUTO: 1.8 K/UL (ref 2.5–16.5)
LYMPHOCYTES NFR BLD AUTO: 18.7 % (ref 20.5–51.1)
MAGNESIUM SERPL-MCNC: 1.9 MG/DL (ref 1.8–2.4)
MCH RBC QN AUTO: 29 PG (ref 27–31)
MCHC RBC AUTO-ENTMCNC: 33 G/DL (ref 33–37)
MCV RBC AUTO: 88 FL (ref 80–94)
MONOCYTES # BLD AUTO: 0.7 K/UL (ref 0.8–1)
MONOCYTES NFR BLD AUTO: 7.1 % (ref 1.7–9.3)
NEUTROPHILS # BLD AUTO: 7 K/UL (ref 1.8–7.7)
NEUTROPHILS NFR BLD AUTO: 73 % (ref 42.2–75.2)
PHOSPHATE SERPL-MCNC: 3 MG/DL (ref 2.5–4.9)
PLATELET # BLD AUTO: 271 K/UL (ref 140–450)
POTASSIUM SERPL-SCNC: 3.9 MMOL/L (ref 3.5–5.1)
RBC # BLD AUTO: 4.44 MIL/UL (ref 4.2–5.4)
SODIUM SERPL-SCNC: 140 MMOL/L (ref 136–145)
WBC # BLD AUTO: 9.6 K/UL (ref 4.8–10.8)

## 2018-06-08 RX ADMIN — DOCUSATE SODIUM SCH MG: 100 CAPSULE, LIQUID FILLED ORAL at 09:47

## 2018-06-08 RX ADMIN — ATORVASTATIN CALCIUM SCH MG: 20 TABLET, FILM COATED ORAL at 09:47

## 2018-06-08 RX ADMIN — PANTOPRAZOLE SODIUM SCH MG: 40 INJECTION, POWDER, FOR SOLUTION INTRAVENOUS at 09:46

## 2018-06-08 RX ADMIN — DEXTROSE AND SODIUM CHLORIDE SCH MLS/HR: 5; .9 INJECTION, SOLUTION INTRAVENOUS at 00:11

## 2018-06-08 RX ADMIN — DEXTROSE AND SODIUM CHLORIDE SCH MLS/HR: 5; .9 INJECTION, SOLUTION INTRAVENOUS at 11:18

## 2018-06-08 RX ADMIN — HYDROCODONE BITARTRATE AND ACETAMINOPHEN PRN TAB: 5; 325 TABLET ORAL at 01:45

## 2018-06-08 NOTE — NUR
PATIENT LYING DOWN IN BED SLEEPING, AROUSABLE BY VOICE. NO DISTRESS NOTED. DENIES ANY PAIN 
AT THIS TIME. RESPIRATIONS EVEN, UNLABORED, ON ROOM AIR. AAOX4, CALM, COOPERATIVE, SKIN 
COLOR APPROPRIATE TO ETHNICITY, WARM TO TOUCH. HAS 4 LAPARASCOPIC WOUNDS ON ABDOMEN S/P LAP 
MARY ON 06/06/18, ABD WOUNDS DRESSING IS DRY AND INTACT, NO BLEEDING NOTED. LUNGS CTA ON 
ALL LOBES. ABLE TO AMBULATE TO BATHROOM AND BACK TO BED WITH ASSISTANCE DUE TO S/P LAP 
MARY. IV SITE INTACT, PATENT, AND INFUSING IVF AS PER MD ORDERS. REVIEWED PLAN OF CARE WITH 
PATIENT. PATIENT VERBALIZED UNDERSTANDING. SAFETY MEASURES IN PLACE, CALL LIGHT WITHIN 
REACH. WILL CONTINUE TO MONITOR.

## 2018-06-08 NOTE — NUR
PROVIDED DISCHARGE INSTRUCTIONS TO PATIENT/FAMILY MEMBERS AT BEDSIDE IN ENGLISH.  
SERVICES OFFERED TO PATIENT, HOWEVER, PATIENT PREFERRED FAMILY MEMBERS TO TRANSLATE AT 
BEDSIDE. FOLLOW-UP VISIT WITH DR. [facility], NEW/CHANGED MEDICATION REGIMEN, WOUND 
CARE MANAGEMENT, AND DIET REGIMEN PROVIDED TO PATIENT/FAMILY. MEDICATION PRESCRIPTIONS GIVEN 
TO PATIENT/FAMILY. ANSWERED ALL OF PATIENT/FAMILY MEMBERS QUESTIONS REGARDING DISCHARGE. 
PATIENT/FAMILY VERBALIZED UNDERSTANDING. IV SITE REMOVED WITH MINIMAL BLOOD AND LUMEN 
COMPLETELY INTACT. ID BANDS REMOVED. PATIENT TO GET DRESSED AND THEN IS READY TO GO HOME 
WITH FAMILY MEMBERS AT BEDSIDE. WILL CONTINUE TO MONITOR.

## 2018-06-08 NOTE — NUR
PT STABLE, NO SIGNS OF DISTRESS NOTED AT THIS TIME. BED IN LOWEST POSITION, BED ALARM ON. 
CALL LIGHT WITHIN REACH, WILL CONTINUE TO MONITOR.

## 2018-06-08 NOTE — NUR
PATIENT SITTING IN BED COMFORTABLY. NO DISTRESS NOTED. DENIES ANY PAIN. NO BM YET, BUT HAS 
HAD PASS GAS. SAFETY MEASURES IN PLACE, CALL LIGHT WITHIN REACH. WILL CONTINUE TO MONITOR.

## 2018-06-08 NOTE — NUR
PT STATES SHES FEELING NAUSEOUS BUT DOES NOT WANT MEDICATION. HELPED PT TO GET UP, PT RINSED 
MOUTH AND STATES SHES FEELING MUCH BETTER.

## 2018-06-08 NOTE — NUR
PATIENT LYING IN BED COMFORTABLY. NO DISTRESS NOTED. DENIES ANY PAIN. REPORTS HAVING PASS 
GAS YESTERDAY NIGHT. SCHEDULED MEDICATIONS DUE GIVEN. SAFETY MEASURES IN PLACE, CALL LIGHT 
WITHIN REACH. WILL CONTINUE OT MONITOR.

## 2018-06-08 NOTE — NUR
PATIENT ALL DRESSED UP AND READY TO GO HOME. ESCORTED PATIENT DOWN TO LOBBY VIA WHEELCHAIR. 
PATIENT ABLE TO GET FROM WHEELCHAIR INTO PRIVATE VEHICLE. PATIENT DISCHARGED AT THIS TIME TO 
HOME VIA PRIVATE VEHICLE IN STABLE CONDITION.

## 2018-06-08 NOTE — NUR
PT REQUESTED NORCO BECAUSE SHE STATES NORCO WORKS BETTER FOR HER 6/10 PAIN. ADMINISTERED 
NORCO, PT TOLERATED WELL. NO SIGNS OF DISTRESS NOTED AT THIS TIME. BED IN LOWEST POSITION, 
BED ALARM ON. CALL LIGHT WITHIN REACH, WILL CONTINUE TO MONITOR.

## 2018-06-10 ENCOUNTER — HOSPITAL ENCOUNTER (INPATIENT)
Dept: HOSPITAL 26 - MED | Age: 43
LOS: 3 days | Discharge: HOME | DRG: 284 | End: 2018-06-13
Attending: GENERAL PRACTICE | Admitting: GENERAL PRACTICE
Payer: MEDICAID

## 2018-06-10 VITALS — HEIGHT: 61 IN | BODY MASS INDEX: 33.99 KG/M2 | WEIGHT: 180 LBS

## 2018-06-10 VITALS — SYSTOLIC BLOOD PRESSURE: 108 MMHG | DIASTOLIC BLOOD PRESSURE: 47 MMHG

## 2018-06-10 VITALS — SYSTOLIC BLOOD PRESSURE: 115 MMHG | DIASTOLIC BLOOD PRESSURE: 49 MMHG

## 2018-06-10 DIAGNOSIS — Z90.49: ICD-10-CM

## 2018-06-10 DIAGNOSIS — E44.1: ICD-10-CM

## 2018-06-10 DIAGNOSIS — E66.9: ICD-10-CM

## 2018-06-10 DIAGNOSIS — K80.50: Primary | ICD-10-CM

## 2018-06-10 DIAGNOSIS — K85.90: ICD-10-CM

## 2018-06-10 DIAGNOSIS — E87.6: ICD-10-CM

## 2018-06-10 LAB
ALBUMIN FLD-MCNC: 3.3 G/DL (ref 3.4–5)
ALBUMIN FLD-MCNC: 3.4 G/DL (ref 3.4–5)
AMYLASE SERPL-CCNC: 119 U/L (ref 25–115)
ANION GAP SERPL CALCULATED.3IONS-SCNC: 7.9 MMOL/L (ref 8–16)
APPEARANCE UR: CLEAR
AST SERPL-CCNC: 89 U/L (ref 15–37)
BASOPHILS # BLD AUTO: 0 K/UL (ref 0–0.22)
BASOPHILS NFR BLD AUTO: 0.3 % (ref 0–2)
BILIRUB SERPL-MCNC: 0.6 MG/DL (ref 0–1)
BILIRUB UR QL STRIP: NEGATIVE
BUN SERPL-MCNC: 12 MG/DL (ref 7–18)
CHLORIDE SERPL-SCNC: 102 MMOL/L (ref 98–107)
CHOLEST/HDLC SERPL: 4.3 {RATIO} (ref 1–4.5)
CO2 SERPL-SCNC: 29.5 MMOL/L (ref 21–32)
COLOR UR: YELLOW
CREAT SERPL-MCNC: 0.8 MG/DL (ref 0.6–1.3)
EOSINOPHIL # BLD AUTO: 0.1 K/UL (ref 0–0.4)
EOSINOPHIL NFR BLD AUTO: 0.6 % (ref 0–4)
ERYTHROCYTE [DISTWIDTH] IN BLOOD BY AUTOMATED COUNT: 13.8 % (ref 11.6–13.7)
GFR SERPL CREATININE-BSD FRML MDRD: 102 ML/MIN (ref 90–?)
GLUCOSE SERPL-MCNC: 129 MG/DL (ref 74–106)
GLUCOSE UR STRIP-MCNC: NEGATIVE MG/DL
HCT VFR BLD AUTO: 38.9 % (ref 36–48)
HDLC SERPL-MCNC: 37 MG/DL (ref 40–60)
HGB BLD-MCNC: 12.8 G/DL (ref 12–16)
HGB UR QL STRIP: (no result)
LDLC SERPL CALC-MCNC: 101 MG/DL (ref 60–100)
LEUKOCYTE ESTERASE UR QL STRIP: NEGATIVE
LIPASE SERPL-CCNC: 701 U/L (ref 73–393)
LYMPHOCYTES # BLD AUTO: 1 K/UL (ref 2.5–16.5)
LYMPHOCYTES NFR BLD AUTO: 9 % (ref 20.5–51.1)
MAGNESIUM SERPL-MCNC: 2 MG/DL (ref 1.8–2.4)
MCH RBC QN AUTO: 29 PG (ref 27–31)
MCHC RBC AUTO-ENTMCNC: 33 G/DL (ref 33–37)
MCV RBC AUTO: 87.9 FL (ref 80–94)
MONOCYTES # BLD AUTO: 0.6 K/UL (ref 0.8–1)
MONOCYTES NFR BLD AUTO: 4.9 % (ref 1.7–9.3)
NEUTROPHILS # BLD AUTO: 9.8 K/UL (ref 1.8–7.7)
NEUTROPHILS NFR BLD AUTO: 85.2 % (ref 42.2–75.2)
NITRITE UR QL STRIP: NEGATIVE
PH UR STRIP: 6 [PH] (ref 5–9)
PHOSPHATE SERPL-MCNC: 3.2 MG/DL (ref 2.5–4.9)
PLATELET # BLD AUTO: 290 K/UL (ref 140–450)
POTASSIUM SERPL-SCNC: 3.4 MMOL/L (ref 3.5–5.1)
PROTHROMBIN TIME: 10.4 SECS (ref 10.8–13.4)
RBC # BLD AUTO: 4.42 MIL/UL (ref 4.2–5.4)
RBC #/AREA URNS HPF: (no result) /HPF (ref 0–5)
SODIUM SERPL-SCNC: 136 MMOL/L (ref 136–145)
T4 FREE SERPL-MCNC: 1.45 NG/DL (ref 0.76–1.46)
TRIGL SERPL-MCNC: 103 MG/DL (ref 30–150)
TSH SERPL DL<=0.05 MIU/L-ACNC: 2.98 UIU/ML (ref 0.34–3.74)
WBC # BLD AUTO: 11.5 K/UL (ref 4.8–10.8)
WBC,URINE: (no result) /HPF (ref 0–5)

## 2018-06-10 RX ADMIN — SODIUM CHLORIDE SCH MLS/HR: 9 INJECTION, SOLUTION INTRAVENOUS at 20:26

## 2018-06-10 NOTE — NUR
ADMITTED A 42F FROM ER. CAME BY DONOVANCHRISTIANO ACCOMPANIED BY FAMILY MEMBERS. AMBULATORY. 
AWAKE,ALERT AND ORIENTED X4. Telugu SPEAKING. CAME DUE TO UPPER ABDOMINAL PAIN WITH N/V. 
PAIN RADIATES TO THE LOWER BACK. AT HOME PT SAID SHE TAKES NORCO BUT LATELY IT DOESN'T HELP 
FOR THE PAIN. PT S/P  LAP MARY  4 DAYS AGO, ABDOMEN STILL WITH INCISIONSX4 WITH BIG BAND 
AIDS DRESSING. DRESSING JUST CHANGED AT HOME TODAY. LAST BM WAS YESTERDAY. DENIES ANY 
DIARRHEA /CONSTIPATION. HAS HL ON THE RT AC #22,CLEAR AND PATENT. PAN OF CARE DISCUSSED AND 
VERBALIZED UNDERSTANDING. BED PLACED IN LOWEST POSITION, CALL LIGHT WITHIN EASY REACH. 
ORIENTED TO HOSPITAL ROUTINES. WILL CONITNUE TO MONITOR.

## 2018-06-10 NOTE — NUR
42Y/F C/O RUQ ABDOMINAL PAIN W/ NAUSEA X 2 HOURS. PT IS 6 DAYS S/P-OP 
LAP/MARY.AAOX4 WITH EVEN AND STEADY GAIT; PATIENT STATES PAIN OF 10/10 AT THIS 
TIME; PATIENT POSITIONED FOR COMFORT; HOB ELEVATED; BEDRAILS UP X1; BED DOWN. 
ER MD MADE AWARE OF PT STATUS.

## 2018-06-10 NOTE — NUR
Patient will be admitted to care of DR. JOSEPH. Admited to TELE.  Will go to 
room 120B. Belongings list completed.  Report to WILMAR BROWN.

## 2018-06-11 VITALS — DIASTOLIC BLOOD PRESSURE: 75 MMHG | SYSTOLIC BLOOD PRESSURE: 130 MMHG

## 2018-06-11 VITALS — DIASTOLIC BLOOD PRESSURE: 61 MMHG | SYSTOLIC BLOOD PRESSURE: 128 MMHG

## 2018-06-11 VITALS — SYSTOLIC BLOOD PRESSURE: 123 MMHG | DIASTOLIC BLOOD PRESSURE: 58 MMHG

## 2018-06-11 VITALS — SYSTOLIC BLOOD PRESSURE: 129 MMHG | DIASTOLIC BLOOD PRESSURE: 63 MMHG

## 2018-06-11 LAB
ANION GAP SERPL CALCULATED.3IONS-SCNC: 9.5 MMOL/L (ref 8–16)
BASOPHILS # BLD AUTO: 0 K/UL (ref 0–0.22)
BASOPHILS NFR BLD AUTO: 0.4 % (ref 0–2)
BUN SERPL-MCNC: 10 MG/DL (ref 7–18)
CHLORIDE SERPL-SCNC: 106 MMOL/L (ref 98–107)
CO2 SERPL-SCNC: 27.1 MMOL/L (ref 21–32)
CREAT SERPL-MCNC: 0.7 MG/DL (ref 0.6–1.3)
EOSINOPHIL # BLD AUTO: 0.1 K/UL (ref 0–0.4)
EOSINOPHIL NFR BLD AUTO: 1.4 % (ref 0–4)
ERYTHROCYTE [DISTWIDTH] IN BLOOD BY AUTOMATED COUNT: 13.4 % (ref 11.6–13.7)
GFR SERPL CREATININE-BSD FRML MDRD: 119 ML/MIN (ref 90–?)
GLUCOSE SERPL-MCNC: 109 MG/DL (ref 74–106)
HCT VFR BLD AUTO: 38.6 % (ref 36–48)
HGB BLD-MCNC: 12.8 G/DL (ref 12–16)
LYMPHOCYTES # BLD AUTO: 1.5 K/UL (ref 2.5–16.5)
LYMPHOCYTES NFR BLD AUTO: 20.6 % (ref 20.5–51.1)
MCH RBC QN AUTO: 29 PG (ref 27–31)
MCHC RBC AUTO-ENTMCNC: 33 G/DL (ref 33–37)
MCV RBC AUTO: 88.8 FL (ref 80–94)
MONOCYTES # BLD AUTO: 0.5 K/UL (ref 0.8–1)
MONOCYTES NFR BLD AUTO: 6.6 % (ref 1.7–9.3)
NEUTROPHILS # BLD AUTO: 5.1 K/UL (ref 1.8–7.7)
NEUTROPHILS NFR BLD AUTO: 71 % (ref 42.2–75.2)
PLATELET # BLD AUTO: 294 K/UL (ref 140–450)
POTASSIUM SERPL-SCNC: 3.6 MMOL/L (ref 3.5–5.1)
RBC # BLD AUTO: 4.35 MIL/UL (ref 4.2–5.4)
SODIUM SERPL-SCNC: 139 MMOL/L (ref 136–145)
WBC # BLD AUTO: 7.1 K/UL (ref 4.8–10.8)

## 2018-06-11 RX ADMIN — TAZOBACTAM SODIUM AND PIPERACILLIN SODIUM SCH MLS/HR: 375; 3 INJECTION, SOLUTION INTRAVENOUS at 21:39

## 2018-06-11 RX ADMIN — TAZOBACTAM SODIUM AND PIPERACILLIN SODIUM SCH MLS/HR: 375; 3 INJECTION, SOLUTION INTRAVENOUS at 12:38

## 2018-06-11 RX ADMIN — SODIUM CHLORIDE SCH MLS/HR: 9 INJECTION, SOLUTION INTRAVENOUS at 11:43

## 2018-06-11 NOTE — NUR
CHECKED ON PT IN ROOM. FAMILY MEMBER AT BEDSIDE. PT DENIES ABD PAIN, NAUSEA AND VOMITING. NO 
SIGNS OF DISTRESS. TOLD PT TO CALL IF NEEDING ANYTHING. VERBALIZED UNDERSTANDING. CALL LIGHT 
WITHIN REACH. WILL CONTINUE TO MONITOR.

## 2018-06-11 NOTE — NUR
PT AWAKE AND  AT BEDSIDE. NO NAUSEA OR ABD PAIN REPORTED. PT AMBULATED TO BATHROOM 
WITH STEADY GAIT. WILL CONTINUE TO MONITOR

## 2018-06-11 NOTE — NUR
PT SLEEPING WITH VISIBLE RESPIRATIONS. DENIES PAIN. CHECKED ABD INCISIONS. 4 SURGICAL 
INCISIONS WITH DARREN S/P LAP MARY. CHANGED BAND AIDS. AND COVERED WITH ABD BINDER. NO 
REBOUND TENDERNESS. NO DRAINAGE OR ODOR NOTED. WILL CONTINUE TO MONITOR.

## 2018-06-11 NOTE — NUR
RECEIVED REPORT FROM NIGHT SHIFT NURSE KEVIN AT BEDSIDE FOR CONTINUITY OF CARE. PT IS AWAKE 
AND ORIENTED X4. INTRODUCED SELF AND UPDATED BOARD. IV TO R AC 22G INTACT WITH NS @60ML/HR. 
SKIN WARM AND DRY. 4 ABD INCISIONS COVERED WITH BAND-AIDS. DRY AND INTACT. PT ON RA O2 SAT 
98%. PT DENIES PAIN AT THIS TIME. NO N/V REPORTED. NO SIGNS OF DISTRESS. CALL LIGHT WITHIN 
REACH. BED IN LOW POSITION, WHEELS LOCKED. WILL CONTINUE TO MONITOR.

## 2018-06-11 NOTE — NUR
CHECKED ON PT IN ROOM. TOLERATED CLEAR LIQUID DIET. PT DENIES NAUSEA OR VOMITING AFTER 
EATING. DENIES PAIN. PT'S  IS AT BEDSIDE. NO SIGNS OF DISTRESS. CALL LIGHT WITHIN 
REACH. WILL CONTINUE TO MONITOR.

## 2018-06-12 VITALS — DIASTOLIC BLOOD PRESSURE: 64 MMHG | SYSTOLIC BLOOD PRESSURE: 110 MMHG

## 2018-06-12 VITALS — SYSTOLIC BLOOD PRESSURE: 114 MMHG | DIASTOLIC BLOOD PRESSURE: 50 MMHG

## 2018-06-12 VITALS — SYSTOLIC BLOOD PRESSURE: 109 MMHG | DIASTOLIC BLOOD PRESSURE: 70 MMHG

## 2018-06-12 LAB
ALBUMIN FLD-MCNC: 2.9 G/DL (ref 3.4–5)
AMYLASE SERPL-CCNC: 142 U/L (ref 25–115)
ANION GAP SERPL CALCULATED.3IONS-SCNC: 12.2 MMOL/L (ref 8–16)
AST SERPL-CCNC: 56 U/L (ref 15–37)
BASOPHILS # BLD AUTO: 0 K/UL (ref 0–0.22)
BASOPHILS NFR BLD AUTO: 0.4 % (ref 0–2)
BILIRUB SERPL-MCNC: 0.3 MG/DL (ref 0–1)
BUN SERPL-MCNC: 8 MG/DL (ref 7–18)
CHLORIDE SERPL-SCNC: 104 MMOL/L (ref 98–107)
CO2 SERPL-SCNC: 26.8 MMOL/L (ref 21–32)
CREAT SERPL-MCNC: 0.7 MG/DL (ref 0.6–1.3)
EOSINOPHIL # BLD AUTO: 0.2 K/UL (ref 0–0.4)
EOSINOPHIL NFR BLD AUTO: 2.8 % (ref 0–4)
ERYTHROCYTE [DISTWIDTH] IN BLOOD BY AUTOMATED COUNT: 13.3 % (ref 11.6–13.7)
GFR SERPL CREATININE-BSD FRML MDRD: 119 ML/MIN (ref 90–?)
GLUCOSE SERPL-MCNC: 92 MG/DL (ref 74–106)
HCT VFR BLD AUTO: 38.3 % (ref 36–48)
HGB BLD-MCNC: 12.5 G/DL (ref 12–16)
LIPASE SERPL-CCNC: 839 U/L (ref 73–393)
LYMPHOCYTES # BLD AUTO: 1.5 K/UL (ref 2.5–16.5)
LYMPHOCYTES NFR BLD AUTO: 21 % (ref 20.5–51.1)
MAGNESIUM SERPL-MCNC: 2.1 MG/DL (ref 1.8–2.4)
MCH RBC QN AUTO: 29 PG (ref 27–31)
MCHC RBC AUTO-ENTMCNC: 33 G/DL (ref 33–37)
MCV RBC AUTO: 89 FL (ref 80–94)
MONOCYTES # BLD AUTO: 0.5 K/UL (ref 0.8–1)
MONOCYTES NFR BLD AUTO: 6.8 % (ref 1.7–9.3)
NEUTROPHILS # BLD AUTO: 5 K/UL (ref 1.8–7.7)
NEUTROPHILS NFR BLD AUTO: 69 % (ref 42.2–75.2)
PHOSPHATE SERPL-MCNC: 2.5 MG/DL (ref 2.5–4.9)
PLATELET # BLD AUTO: 277 K/UL (ref 140–450)
POTASSIUM SERPL-SCNC: 4 MMOL/L (ref 3.5–5.1)
RBC # BLD AUTO: 4.3 MIL/UL (ref 4.2–5.4)
SODIUM SERPL-SCNC: 139 MMOL/L (ref 136–145)
T3RU NFR SERPL: 26 % (ref 24–39)
T4 SERPL-MCNC: 10.2 UG/DL (ref 4.5–12)
WBC # BLD AUTO: 7.2 K/UL (ref 4.8–10.8)

## 2018-06-12 RX ADMIN — TAZOBACTAM SODIUM AND PIPERACILLIN SODIUM SCH MLS/HR: 375; 3 INJECTION, SOLUTION INTRAVENOUS at 05:02

## 2018-06-12 RX ADMIN — TAZOBACTAM SODIUM AND PIPERACILLIN SODIUM SCH MLS/HR: 375; 3 INJECTION, SOLUTION INTRAVENOUS at 20:54

## 2018-06-12 RX ADMIN — TAZOBACTAM SODIUM AND PIPERACILLIN SODIUM SCH MLS/HR: 375; 3 INJECTION, SOLUTION INTRAVENOUS at 12:43

## 2018-06-12 RX ADMIN — SODIUM CHLORIDE SCH MLS/HR: 9 INJECTION, SOLUTION INTRAVENOUS at 05:01

## 2018-06-12 NOTE — NUR
PATIENT AWAKE, ALERT. RESPIRATION EVEN, UNLABOR ON ROOM AIR. NO DISTRESS NOTED AT THIS TIME. 
FAMILY AT BEDSIDE. CALL LIGHT WITHIN REACH

## 2018-06-12 NOTE — NUR
PATIENT AWAKE, ALERT. RESPIRATION EVEN, UNLABOR ON ROOM AIR. SKIN DRY AND WARM. LUNGS SOUND 
CLEAR THROUGHOUT. BOWELS SOUND ACTIVE 4 QUADRANTS. DRESSING DRY AND INTACT. REGULAR CARDIAC 
RHYTHM. DENIED PAIN AT THIS TIME. PLAN OF CARE WAS DISCUSSED WITH PATIENT. BED AT LOW 
POSITION, SIDE RAILS UP. CALL LIGHT WITHIN REACH

## 2018-06-12 NOTE — NUR
PATIENT AWAKE, ALERT. RESPIRATION EVEN, UNLABOR ON ROOM AIR. DENIED PAIN, SOB AT THIS TIME. 
NO DISTRESS NOTED. FAMILY AT BEDSIDE. CALL LIGHT WITHIN REACH

## 2018-06-12 NOTE — NUR
PATIENT HAS BEEN SCREENED AND CATEGORIZED AS MODERATE NUTRITION RISK. PATIENT WILL BE SEEN 
WITHIN 3-5 DAYS OF ADMISSION.



6/13/18  6/15/18



KIN NEAL RD

## 2018-06-12 NOTE — NUR
ERCP PROCEDURE CONSENT WAS OBTAINED AT BEDSIDE. PATIENT VERBALIZED UNDERSTANDING OF RISKS 
AND BENEFITS. MD WAS AT BEDSIDE

## 2018-06-12 NOTE — NUR
PATIENT AWAKE, ALERT. RESPIRATION EVEN, UNLABOR ON ROOM AIR. FAMILY AT BEDSIDE. NO DISTRESS 
NOTED AT THIS TIME

## 2018-06-13 VITALS — DIASTOLIC BLOOD PRESSURE: 43 MMHG | SYSTOLIC BLOOD PRESSURE: 107 MMHG

## 2018-06-13 VITALS — DIASTOLIC BLOOD PRESSURE: 68 MMHG | SYSTOLIC BLOOD PRESSURE: 127 MMHG

## 2018-06-13 VITALS — SYSTOLIC BLOOD PRESSURE: 110 MMHG | DIASTOLIC BLOOD PRESSURE: 43 MMHG

## 2018-06-13 LAB
ALBUMIN FLD-MCNC: 3 G/DL (ref 3.4–5)
AMYLASE SERPL-CCNC: 150 U/L (ref 25–115)
ANION GAP SERPL CALCULATED.3IONS-SCNC: 12.6 MMOL/L (ref 8–16)
AST SERPL-CCNC: 26 U/L (ref 15–37)
BASOPHILS # BLD AUTO: 0 K/UL (ref 0–0.22)
BASOPHILS NFR BLD AUTO: 0.3 % (ref 0–2)
BILIRUB SERPL-MCNC: 0.5 MG/DL (ref 0–1)
BUN SERPL-MCNC: 10 MG/DL (ref 7–18)
CHLORIDE SERPL-SCNC: 104 MMOL/L (ref 98–107)
CO2 SERPL-SCNC: 25.8 MMOL/L (ref 21–32)
CREAT SERPL-MCNC: 0.8 MG/DL (ref 0.6–1.3)
EOSINOPHIL # BLD AUTO: 0.2 K/UL (ref 0–0.4)
EOSINOPHIL NFR BLD AUTO: 2.5 % (ref 0–4)
ERYTHROCYTE [DISTWIDTH] IN BLOOD BY AUTOMATED COUNT: 13.3 % (ref 11.6–13.7)
GFR SERPL CREATININE-BSD FRML MDRD: 102 ML/MIN (ref 90–?)
GLUCOSE SERPL-MCNC: 99 MG/DL (ref 74–106)
HCT VFR BLD AUTO: 37.4 % (ref 36–48)
HGB BLD-MCNC: 12.7 G/DL (ref 12–16)
LIPASE SERPL-CCNC: 925 U/L (ref 73–393)
LYMPHOCYTES # BLD AUTO: 1.8 K/UL (ref 2.5–16.5)
LYMPHOCYTES NFR BLD AUTO: 23.5 % (ref 20.5–51.1)
MAGNESIUM SERPL-MCNC: 2.1 MG/DL (ref 1.8–2.4)
MCH RBC QN AUTO: 30 PG (ref 27–31)
MCHC RBC AUTO-ENTMCNC: 34 G/DL (ref 33–37)
MCV RBC AUTO: 88.1 FL (ref 80–94)
MONOCYTES # BLD AUTO: 0.5 K/UL (ref 0.8–1)
MONOCYTES NFR BLD AUTO: 6.6 % (ref 1.7–9.3)
NEUTROPHILS # BLD AUTO: 5.1 K/UL (ref 1.8–7.7)
NEUTROPHILS NFR BLD AUTO: 67.1 % (ref 42.2–75.2)
PHOSPHATE SERPL-MCNC: 2.9 MG/DL (ref 2.5–4.9)
PLATELET # BLD AUTO: 290 K/UL (ref 140–450)
POTASSIUM SERPL-SCNC: 3.4 MMOL/L (ref 3.5–5.1)
RBC # BLD AUTO: 4.24 MIL/UL (ref 4.2–5.4)
SODIUM SERPL-SCNC: 139 MMOL/L (ref 136–145)
WBC # BLD AUTO: 7.6 K/UL (ref 4.8–10.8)

## 2018-06-13 RX ADMIN — SODIUM CHLORIDE SCH MLS/HR: 9 INJECTION, SOLUTION INTRAVENOUS at 01:32

## 2018-06-13 RX ADMIN — TAZOBACTAM SODIUM AND PIPERACILLIN SODIUM SCH MLS/HR: 375; 3 INJECTION, SOLUTION INTRAVENOUS at 04:17

## 2018-06-13 RX ADMIN — TAZOBACTAM SODIUM AND PIPERACILLIN SODIUM SCH MLS/HR: 375; 3 INJECTION, SOLUTION INTRAVENOUS at 12:19

## 2018-06-13 NOTE — NUR
6/13/18 

RD INITIAL ASSESSMENT COMPLETED



PLEASE REFER TO NUTRITION ASSESSMENT UNDER CARE ACTIVITY FOR ESTIMATED NUTRITIONAL NEEDS. 



1. CONTINUE NPO STATUS AS MEDICALLY APPROPRIATE 

2. IF/WHEN MEDICALLY APPROPRIATE TO ADVANCE DIET, CONSIDER BRAT DIET.

3. PROVIDED NUTRITION EDUCATION FOR LOW FAT DIET

4. RD TO FOLLOW-UP 3-5 DAYS, MODERATE RISK 



KIN NEAL RD

## 2018-06-13 NOTE — NUR
PT FINISHED DINNER. DENIES ANY PAIN, NAUSEA AND VOMITING. NO S/S OF ACUTE DISTRESS. PIC 
TAKEN FOR THE LAP MARY INCISIONS. REAPPLIED ABD BINDER.

## 2018-06-13 NOTE — NUR
RECEIVED PT REPORT FROM NIGHT SHIFT RN AT BEDSIDE. PT IS SLEEPING, EASILY AROUSED. OX4. NO 
S/S OF ACUTE DISTRESS. LUNG SOUNDS ARE CLEAR. BOWELS SOUND ACTIVE. S/P LAP MARY 1 WEEK, 4 
INCISION COVERED WITH BANDAGE. DRESSING DRY AND INTACT. IV NOTED TO THE RIGHT HAND 22G, 
PATENT, INTACT AND ASYMPTOMATIC. DENIED PAIN AT THIS TIME. PLAN OF CARE WAS DISCUSSED WITH 
PATIENT. BED IN LOWEST POSITION. CALL LIGHT WITHIN REACH, WILL CONTINUE TO MONITOR.

## 2018-06-13 NOTE — NUR
PT DISCHARGED PER MD ORDER. MADE PT AND HER  AWARE  OF THE FOLLOW UP APPTS WITH DR POTTS 
AND DR JOSH MARTÍNEZ FOR PT. DISCHARGE AND MEDICATION TEACHING PROVIDED. PT VERBALIZED 
UNDERSTANDING. IV DC'D, TIP INTACT, PRESSURE APPLIED. PT LEFT WITH ALL HER BELONGING AND IN 
STABLE CONDITION.

## 2021-04-13 ENCOUNTER — HOSPITAL ENCOUNTER (EMERGENCY)
Dept: HOSPITAL 26 - MED | Age: 46
Discharge: HOME | End: 2021-04-13
Payer: MEDICAID

## 2021-04-13 VITALS — DIASTOLIC BLOOD PRESSURE: 89 MMHG | SYSTOLIC BLOOD PRESSURE: 147 MMHG

## 2021-04-13 VITALS — HEIGHT: 62 IN | BODY MASS INDEX: 32.76 KG/M2 | WEIGHT: 178 LBS

## 2021-04-13 VITALS — SYSTOLIC BLOOD PRESSURE: 147 MMHG | DIASTOLIC BLOOD PRESSURE: 89 MMHG

## 2021-04-13 DIAGNOSIS — S61.012A: Primary | ICD-10-CM
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
## 2021-04-13 DIAGNOSIS — Z90.49: ICD-10-CM
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
## 2021-04-13 DIAGNOSIS — Z79.899: ICD-10-CM

## 2021-04-13 PROCEDURE — 90715 TDAP VACCINE 7 YRS/> IM: CPT

## 2021-04-13 PROCEDURE — 90471 IMMUNIZATION ADMIN: CPT

## 2021-04-13 PROCEDURE — 99283 EMERGENCY DEPT VISIT LOW MDM: CPT

## 2021-04-13 PROCEDURE — 12001 RPR S/N/AX/GEN/TRNK 2.5CM/<: CPT

## 2021-04-13 NOTE — NUR
45F c/c laceration on the right thumb after cutting her finger on glass while 
washing dishes. 10/10 sharp pain, no tendon, muscle or bone noted. CMS+, <3sec 
cap refill.



PMH: Santino MARSHALL

## 2021-09-29 NOTE — NUR
PT ASLEEP. NO S/S OF ANY DISCOMFORT AT THIS TIME.  WILL CONTINUE TO MONITOR. Patient unable to complete